# Patient Record
(demographics unavailable — no encounter records)

---

## 2023-11-06 NOTE — ED PHYSICIAN DOCUMENTATION
PD HPI ABD PAIN





- Stated complaint


Stated Complaint: ABD PX





- Chief complaint


Chief Complaint: Abd Pain





- History obtained from


History obtained from: Patient





- Additional information


Additional information: 


Patient is a 20-year-old female with a history of idiopathic pancreatitis, 

endometriosis Presenting for sharp epigastric abdominal pain with nausea 

starting this morning when she woke up.  She reports feeling generalized 

bloating.  No vomiting.  No diarrhea.  She is scheduled for upper endoscopy 

later this month as well as a colonoscopy.  She reports having positive for 

dinner last night.  She feels the need to burp and belch.  Nothing makes her 

pain better or worse.  She denies alcohol use.








Review of Systems


Constitutional: denies: Fever


Cardiac: denies: Chest pain / pressure


Respiratory: denies: Dyspnea


GI: reports: Abdominal Pain, Nausea.  denies: Vomiting, Diarrhea


: denies: Dysuria


Musculoskeletal: reports: Back pain





PD PAST MEDICAL HISTORY





- Past Medical History


Past Medical History: Yes


Cardiovascular: None


Respiratory: None


Neuro: None


Endocrine/Autoimmune: None


GI: Pancreatitis


GYN: Ovarian cysts, Other


: None


HEENT: None


Psych: Depression


Musculoskeletal: None


Derm: None





- Past Surgical History


Past Surgical History: Yes


General: Other


Ortho: ACL reconstruction


/GYN: Oophrectomy, Other





- Present Medications


Home Medications: 


                                Ambulatory Orders











 Medication  Instructions  Recorded  Confirmed


 


oxyCODONE [Roxicodone] 5 mg PO Q6H PRN #12 tablet 11/06/23 














- Allergies


Allergies/Adverse Reactions: 


                                    Allergies











Allergy/AdvReac Type Severity Reaction Status Date / Time


 


amoxicillin Allergy  Rash Verified 11/06/23 07:54


 


doxycycline Allergy  Unknown Verified 08/03/23 13:19


 


lactose Allergy  Unknown Verified 08/03/23 13:19


 


morphine Allergy  Unknown Verified 08/03/23 13:19


 


Morpholine Analogues Allergy  Hives Verified 08/03/23 13:19


 


Dairy Allergy  Unknown Uncoded 08/03/23 13:19














- Social History


Does the pt smoke?: No


Smoking Status: Never smoker


Does the pt drink ETOH?: No


Does the pt have substance abuse?: No





- Immunizations


Immunizations are current?: Yes





- POLST


Patient has POLST: No





PD ED PE NORMAL





- General


General: Alert and oriented X 3, No acute distress, Well developed/nourished





- HEENT


HEENT: Atraumatic, Moist mucous membranes, Pharynx benign





- Neck


Neck: Supple, no meningeal sign





- Cardiac


Cardiac: RRR, No murmur





- Respiratory


Respiratory: No respiratory distress, Clear bilaterally





- Abdomen


Abdomen: Normal bowel sounds, Soft, Non distended, Other (Mild epigastric 

tenderness to palpation)





- Derm


Derm: Warm and dry





- Neuro


Neuro: Normal speech





Results





- Vitals


Vitals: 


                               Vital Signs - 24 hr











  11/06/23 11/06/23 11/06/23





  07:51 09:54 11:00


 


Temperature 37.2 C  


 


Heart Rate 60 56 L 104 H


 


Respiratory 15 16 17





Rate   


 


Blood Pressure 110/72 103/68 110/78


 


O2 Saturation 100 100 99








                                     Oxygen











O2 Source                      Room air

















- Labs


Labs: 


                                Laboratory Tests











  11/06/23 11/06/23 11/06/23





  08:08 08:08 08:18


 


WBC  6.2  


 


RBC  4.59  


 


Hgb  13.9  


 


Hct  41.8  


 


MCV  91.1  


 


MCH  30.3  


 


MCHC  33.3  


 


RDW  12.6  


 


Plt Count  348  


 


MPV  10.3  


 


Neut # (Auto)  3.8  


 


Lymph # (Auto)  1.6  


 


Mono # (Auto)  0.5  


 


Eos # (Auto)  0.2  


 


Baso # (Auto)  0.1  


 


Absolute Nucleated RBC  0.00  


 


Nucleated RBC %  0.0  


 


Sodium   137 


 


Potassium   4.3 


 


Chloride   106 


 


Carbon Dioxide   27 


 


Anion Gap   4.0 L 


 


BUN   15 


 


Creatinine   0.8 


 


Estimated GFR (MDRD)   91 


 


Glucose   89 


 


Calcium   9.1 


 


Total Bilirubin   0.6 


 


AST   12 


 


ALT   9 L 


 


Alkaline Phosphatase   74 


 


Total Protein   6.7 


 


Albumin   4.4 


 


Globulin   2.3 


 


Albumin/Globulin Ratio   1.9 


 


Lipase   26 


 


Urine Color    YELLOW


 


Urine Clarity    CLEAR


 


Urine pH    6.0


 


Ur Specific Gravity    1.020


 


Urine Protein    NEGATIVE


 


Urine Glucose (UA)    NEGATIVE


 


Urine Ketones    NEGATIVE


 


Urine Occult Blood    NEGATIVE


 


Urine Nitrite    NEGATIVE


 


Urine Bilirubin    NEGATIVE


 


Urine Urobilinogen    0.2 (NORMAL)


 


Ur Leukocyte Esterase    NEGATIVE


 


Ur Microscopic Review    NOT INDICATED


 


Urine Culture Comments    NOT INDICATED


 


Urine HCG, Qual    NEGATIVE














PD Medical Decision Making





- ED course


Complexity details: reviewed results, re-evaluated patient, d/w patient


ED course: 





Patient is a 20-year-old female presenting for evaluation of epigastric 

abdominal pain.  She is scheduled for upper endoscopy later this month.  CBC, 

chemistries were obtained and reviewed.  She does have a history of pancreatitis

 but labs are reassuring today.  Urine is negative for infection or pregnancy.  

She does report feeling better after a small dose of Dilaudid as well as Zofran 

and fluids.  She does report having a burning sensation in her throat which was 

relieved with a GI cocktail.  She did report some discomfort in her back but her

 abdominal exam remained benign and was given oxycodone for this with 

improvement.  We discussed imaging but patient would prefer to hold off as it 

has not been revealing in the past and she is scheduled for an upper endoscopy 

coming up.I repeated her abdominal exam several times and it has been benign.   

 She states that her GI doctor took her off of acid reducing medications in the 

past as they were starting a medication for IBS.We discussed avoiding certain 

foods that may trigger her pain.Patient is counseled on treatment plan as well 

as concerning symptoms to return for.








0900 - Repeat abdominal exam is benign.  Patient reports having a burpy burning 

sensation in the back of her throat.  Understands plan to trial a GI cocktail.





Departure





- Departure


Disposition: 01 Home, Self Care


Clinical Impression: 


 Epigastric abdominal pain





Condition: Stable


Instructions:  ED Epigastric Pain [facility]


Prescriptions: 


oxyCODONE [Roxicodone] 5 mg PO Q6H PRN #12 tablet


 PRN Reason: Pain


Comments: 


Your labs are reassuring today with no signs of inflammation to your pancreas, 

liver.  Your white count is normal and your urine is also clear.  Your symptoms 

have improved with pain and nausea medications and at this time we have agreed 

to hold off on imaging.  I would recommend close follow-up with your primary 

care provider and GI doctor.  





Our prescriptions are not sending electronically at this time so I printed a 

narcotic prescription for you to take with you today.








  I would also recommend avoiding anything spicy, acidic or citrusy as this may 

irritate your stomach.  Please return to the ER if you develop any worsening 

symptoms.


Forms:  PCP List


Discharge Date/Time: 11/06/23 11:32

## 2023-11-27 NOTE — ED PHYSICIAN DOCUMENTATION
PD HPI NVD





- Stated complaint


Stated Complaint: N/V/D





- Chief complaint


Chief Complaint: Abd Pain





- History obtained from


History obtained from: Patient





- Additonal information


Additional information: 


Patient is a 20-year-old female with prior visits with GI symptoms presenting 

for evaluation of nausea, vomiting and diarrhea since yesterday.  Patient 

reports multiple loose stools with no blood.  She also reports not able to keep 

any oral intake down and vomiting up food.  She denies eating anything that 

would have irritated her stomach and her  is at the bedside who is also 

had a similar diet without any symptoms.  She has not been on recent 

antibiotics.  She has had a recent upper endoscopy as well as a colonoscopy.  

Results were reviewed by me on her phone and there were some findings of a 

Ceanel [place]ick esophagitis.  She does report some mild upper abdominal pain.  

She is not currently on any medications for GI symptoms.  She does have Zofran 

at home but did not try it prior to arrival.  She reports generalized back 

achiness.  No recent travel.








Review of Systems


Constitutional: denies: Fever


Cardiac: denies: Chest pain / pressure


Respiratory: denies: Dyspnea


GI: reports: Abdominal Pain, Nausea, Vomiting, Diarrhea.  denies: Bloody / black

stool


: denies: Dysuria


Neurologic: reports: Headache





PD PAST MEDICAL HISTORY





- Past Medical History


Past Medical History: Yes


Cardiovascular: None


Respiratory: None


Neuro: None


Endocrine/Autoimmune: None


GI: Pancreatitis


GYN: Ovarian cysts, Other


: None


HEENT: None


Psych: Depression


Musculoskeletal: None


Derm: None





- Past Surgical History


Past Surgical History: Yes


General: Other


Ortho: ACL reconstruction


/GYN: Oophrectomy, Other





- Present Medications


Home Medications: 


                                Ambulatory Orders











 Medication  Instructions  Recorded  Confirmed


 


Promethazine [Phenergan] 25 mg PO Q6H PRN #10 tablet 11/27/23 














- Allergies


Allergies/Adverse Reactions: 


                                    Allergies











Allergy/AdvReac Type Severity Reaction Status Date / Time


 


amoxicillin Allergy  Rash Verified 11/06/23 07:54


 


doxycycline Allergy  Unknown Verified 08/03/23 13:19


 


lactose Allergy  Unknown Verified 08/03/23 13:19


 


morphine Allergy  Unknown Verified 08/03/23 13:19


 


Morpholine Analogues Allergy  Hives Verified 08/03/23 13:19


 


diphenhydramine AdvReac  Anxiety Verified 11/27/23 12:14





[From Benadryl]     


 


Dairy Allergy  Unknown Uncoded 08/03/23 13:19














- Social History


Does the pt smoke?: No


Smoking Status: Never smoker


Does the pt drink ETOH?: No


Does the pt have substance abuse?: No





- Immunizations


Immunizations are current?: Yes





- POLST


Patient has POLST: No





PD ED PE NORMAL





- General


General: Alert and oriented X 3, No acute distress, Well developed/nourished





- HEENT


HEENT: Atraumatic, Moist mucous membranes, Pharynx benign





- Neck


Neck: Supple, no meningeal sign





- Cardiac


Cardiac: RRR, No murmur





- Respiratory


Respiratory: No respiratory distress, Clear bilaterally





- Abdomen


Abdomen: Normal bowel sounds, Soft, Non distended, Other (Mild epigastric 

tenderness)





- Derm


Derm: Warm and dry





- Neuro


Neuro: Normal speech





Results





- Vitals


Vitals: 


                               Vital Signs - 24 hr











  11/27/23 11/27/23 11/27/23





  12:10 12:14 14:14


 


Temperature 37.3 C 37.3 C 37.0 C


 


Heart Rate 73 73 72


 


Respiratory 15 15 16





Rate   


 


Blood Pressure 110/71 110/71 112/70


 


O2 Saturation 99 99 98














  11/27/23





  16:00


 


Temperature 37.0 C


 


Heart Rate 70


 


Respiratory 16





Rate 


 


Blood Pressure 114/68


 


O2 Saturation 100








                                     Oxygen











O2 Source                      Room air

















- Labs


Labs: 


                                Laboratory Tests











  11/27/23 11/27/23 11/27/23





  12:33 12:33 14:40


 


WBC  6.1  


 


RBC  4.91  


 


Hgb  14.7  


 


Hct  44.3  


 


MCV  90.2  


 


MCH  29.9  


 


MCHC  33.2  


 


RDW  12.1  


 


Plt Count  316  


 


MPV  9.8  


 


Neut # (Auto)  4.4  


 


Lymph # (Auto)  1.0 L  


 


Mono # (Auto)  0.6  


 


Eos # (Auto)  0.1  


 


Baso # (Auto)  0.0  


 


Absolute Nucleated RBC  0.00  


 


Nucleated RBC %  0.0  


 


Sodium   137 


 


Potassium   3.7 


 


Chloride   102 


 


Carbon Dioxide   28 


 


Anion Gap   7.0 


 


BUN   15 


 


Creatinine   0.8 


 


Estimated GFR (MDRD)   91 


 


Glucose   81 


 


Calcium   9.4 


 


Total Bilirubin   0.5 


 


AST   14 


 


ALT   13 


 


Alkaline Phosphatase   92 


 


Total Protein   6.5 


 


Albumin   4.4 


 


Globulin   2.1 


 


Albumin/Globulin Ratio   2.1 


 


Lipase   15 


 


Urine Color    YELLOW


 


Urine Clarity    CLEAR


 


Urine pH    6.0


 


Ur Specific Gravity    1.025


 


Urine Protein    NEGATIVE


 


Urine Glucose (UA)    NEGATIVE


 


Urine Ketones    40 H


 


Urine Occult Blood    NEGATIVE


 


Urine Nitrite    NEGATIVE


 


Urine Bilirubin    NEGATIVE


 


Urine Urobilinogen    0.2 (NORMAL)


 


Ur Leukocyte Esterase    NEGATIVE


 


Ur Microscopic Review    NOT INDICATED


 


Urine Culture Comments    NOT INDICATED


 


Urine HCG, Qual    NEGATIVE














PD Medical Decision Making





- ED course


Complexity details: reviewed results, re-evaluated patient, d/w patient, d/w 

family


ED course: 








Patient is a 20-year-old female presenting for evaluation of nausea, vomiting 

and diarrhea for the past 2 days.  Has had had prior GI symptoms and is 

currently following with GI with recent EGD and colonoscopy.  There were some 

findings on her EGD of eosinophilic esophagitis.  She is not currently on any 

medications.  She has not had any vomiting or diarrhea here.  Her abdominal exam

is benign.  Labs reviewed including CBC, chemistries and urinalysis without any 

significant findings.  Benign exam as well as normal labs I do not think 

emergent imaging is indicated at this time. She was given IV Zofran and Toradol 

with some improvement but continued report ongoing nausea and epigastric pain.  

She had complete improvement with Phenergan and GI cocktail.  Prescription for 

Phenergan was also provided to the patient.  She is counseled on need for close 

follow-up as well as concerning symptoms to return for.











Departure





- Departure


Disposition: 01 Home, Self Care


Clinical Impression: 


 Nausea vomiting and diarrhea





Condition: Stable


Instructions:  ED Diet Vomiting Diarrhea


Prescriptions: 


Promethazine [Phenergan] 25 mg PO Q6H PRN #10 tablet


 PRN Reason: Nausea / Vomiting


Comments: 


I have sent a prescription for promethazine which is another antinausea 

medication to [facility] in [place].  Please try this or utilize your Zofran 

at home if you have any continued nausea or vomiting.  It is important to stay 

hydrated.  We were not able to collect any stool for specimen today but if you 

continue to have diarrhea I would recommend close follow-up with your primary 

care provider.  If you develop worsening symptoms such as fever or increased 

pain then please consider return to the emergency department for reevaluation.


Forms:  PCP List


Discharge Date/Time: 11/27/23 16:52